# Patient Record
Sex: FEMALE | Race: BLACK OR AFRICAN AMERICAN
[De-identification: names, ages, dates, MRNs, and addresses within clinical notes are randomized per-mention and may not be internally consistent; named-entity substitution may affect disease eponyms.]

---

## 2020-03-24 ENCOUNTER — HOSPITAL ENCOUNTER (EMERGENCY)
Dept: HOSPITAL 53 - M ED | Age: 60
LOS: 1 days | Discharge: HOME | End: 2020-03-25
Payer: MEDICARE

## 2020-03-24 VITALS — HEIGHT: 67 IN | WEIGHT: 178.35 LBS | BODY MASS INDEX: 27.99 KG/M2

## 2020-03-24 DIAGNOSIS — J45.909: ICD-10-CM

## 2020-03-24 DIAGNOSIS — R07.9: Primary | ICD-10-CM

## 2020-03-24 DIAGNOSIS — Z95.2: ICD-10-CM

## 2020-03-24 DIAGNOSIS — I35.1: ICD-10-CM

## 2020-03-24 DIAGNOSIS — Z88.8: ICD-10-CM

## 2020-03-24 LAB
ALBUMIN SERPL BCG-MCNC: 4.1 GM/DL (ref 3.2–5.2)
ALT SERPL W P-5'-P-CCNC: 18 U/L (ref 12–78)
BASOPHILS # BLD AUTO: 0 10^3/UL (ref 0–0.2)
BASOPHILS NFR BLD AUTO: 0.4 % (ref 0–1)
BILIRUB CONJ SERPL-MCNC: 0.2 MG/DL (ref 0–0.2)
BILIRUB SERPL-MCNC: 0.9 MG/DL (ref 0.2–1)
CK MB CFR.DF SERPL CALC: 1.35
CK MB SERPL-MCNC: < 1 NG/ML (ref ?–3.6)
CK SERPL-CCNC: 74 U/L (ref 26–192)
EOSINOPHIL # BLD AUTO: 0.1 10^3/UL (ref 0–0.5)
EOSINOPHIL NFR BLD AUTO: 1.8 % (ref 0–3)
HCT VFR BLD AUTO: 37.1 % (ref 36–47)
HGB BLD-MCNC: 12.2 G/DL (ref 12–15.5)
INR PPP: 1.03
LIPASE SERPL-CCNC: 169 U/L (ref 73–393)
LYMPHOCYTES # BLD AUTO: 2.3 10^3/UL (ref 1.5–5)
LYMPHOCYTES NFR BLD AUTO: 46.6 % (ref 24–44)
MCH RBC QN AUTO: 33.2 PG (ref 27–33)
MCHC RBC AUTO-ENTMCNC: 32.9 G/DL (ref 32–36.5)
MCV RBC AUTO: 100.8 FL (ref 80–96)
MONOCYTES # BLD AUTO: 0.4 10^3/UL (ref 0–0.8)
MONOCYTES NFR BLD AUTO: 8.8 % (ref 0–5)
NEUTROPHILS # BLD AUTO: 2.1 10^3/UL (ref 1.5–8.5)
NEUTROPHILS NFR BLD AUTO: 42.2 % (ref 36–66)
PLATELET # BLD AUTO: 241 10^3/UL (ref 150–450)
PROT SERPL-MCNC: 8.3 GM/DL (ref 6.4–8.2)
PROTHROMBIN TIME: 13.2 SECONDS (ref 11.8–14)
RBC # BLD AUTO: 3.68 10^6/UL (ref 4–5.4)
TROPONIN I SERPL-MCNC: < 0.02 NG/ML (ref ?–0.1)
WBC # BLD AUTO: 4.9 10^3/UL (ref 4–10)

## 2020-03-24 PROCEDURE — 85610 PROTHROMBIN TIME: CPT

## 2020-03-24 PROCEDURE — 94760 N-INVAS EAR/PLS OXIMETRY 1: CPT

## 2020-03-24 PROCEDURE — 96361 HYDRATE IV INFUSION ADD-ON: CPT

## 2020-03-24 PROCEDURE — 93041 RHYTHM ECG TRACING: CPT

## 2020-03-24 PROCEDURE — 87633 RESP VIRUS 12-25 TARGETS: CPT

## 2020-03-24 PROCEDURE — 85025 COMPLETE CBC W/AUTO DIFF WBC: CPT

## 2020-03-24 PROCEDURE — 80047 BASIC METABLC PNL IONIZED CA: CPT

## 2020-03-24 PROCEDURE — 87798 DETECT AGENT NOS DNA AMP: CPT

## 2020-03-24 PROCEDURE — 80076 HEPATIC FUNCTION PANEL: CPT

## 2020-03-24 PROCEDURE — 93971 EXTREMITY STUDY: CPT

## 2020-03-24 PROCEDURE — 87581 M.PNEUMON DNA AMP PROBE: CPT

## 2020-03-24 PROCEDURE — 96374 THER/PROPH/DIAG INJ IV PUSH: CPT

## 2020-03-24 PROCEDURE — 83690 ASSAY OF LIPASE: CPT

## 2020-03-24 PROCEDURE — 71275 CT ANGIOGRAPHY CHEST: CPT

## 2020-03-24 PROCEDURE — 99285 EMERGENCY DEPT VISIT HI MDM: CPT

## 2020-03-24 PROCEDURE — 82550 ASSAY OF CK (CPK): CPT

## 2020-03-24 PROCEDURE — 82553 CREATINE MB FRACTION: CPT

## 2020-03-24 PROCEDURE — 87486 CHLMYD PNEUM DNA AMP PROBE: CPT

## 2020-03-24 PROCEDURE — 36415 COLL VENOUS BLD VENIPUNCTURE: CPT

## 2020-03-24 PROCEDURE — 93005 ELECTROCARDIOGRAM TRACING: CPT

## 2020-03-24 PROCEDURE — 71045 X-RAY EXAM CHEST 1 VIEW: CPT

## 2020-03-24 PROCEDURE — 84484 ASSAY OF TROPONIN QUANT: CPT

## 2020-03-24 NOTE — REPVR
PROCEDURE INFORMATION: 

Exam: CT Angiography Chest With Contrast 

Exam date and time: 3/24/2020 10:38 PM 

Age: 60 years old 

Clinical indication: Chest pain; Additional info: Central chest pain 



TECHNIQUE: 

Imaging protocol: Computed tomographic angiography of the chest with 

intravenous contrast. 

3D rendering: MIP and/or 3D reconstructed images were created by the 

technologist. 

Radiation optimization: All CT scans at this facility use at least one of these 

dose optimization techniques: automated exposure control; mA and/or kV 

adjustment per patient size (includes targeted exams where dose is matched to 

clinical indication); or iterative reconstruction. 

Contrast material: ISOVUE 370; Contrast volume: 75 ml; Contrast route: IV;  



COMPARISON: 

CR PORTABLE CHEST X-RAY 3/24/2020 9:56 PM 



FINDINGS: 

Pulmonary arteries: Normal. No pulmonary emboli. 

Aorta: Unremarkable. No aortic aneurysm. No aortic dissection. 

Lungs: Mild scarring in the right middle lobe.. No consolidation. No masses. 

Pleural space: Unremarkable. No pneumothorax. No pleural effusion. 

Heart: Unremarkable. No cardiomegaly. No pericardial effusion. 

Lymph nodes: Unremarkable. No enlarged lymph nodes. 

Bones/joints: Unremarkable. No acute fracture. 

Soft tissues: Unremarkable. 



IMPRESSION: 

No acute findings. 



Electronically signed by: Shahram Ko On 03/24/2020  22:52:33 PM

## 2020-03-24 NOTE — REPVR
PROCEDURE INFORMATION: 

Exam: US Duplex Left Lower Extremity Veins, Limited 

Exam date and time: 3/24/2020 11:23 PM 

Age: 60 years old 

Clinical indication: Pain; Leg, upper and leg, lower; Left; Additional info: 

Left leg pain after travel 



TECHNIQUE: 

Imaging protocol: Real-time Duplex ultrasound of the Left Lower Extremity with 

2-D gray scale, color Doppler flow and spectral waveform analysis with image 

documentation. Limited exam focused on the left lower extremity veins. 



COMPARISON: 

No relevant prior studies available. 



FINDINGS: 

Left deep veins: Unremarkable. The common femoral, femoral, proximal profunda 

femoral and popliteal veins are patent without thrombus. Normal Doppler 

waveforms. Normal compressibility and/or augmentation response.  

Left superficial veins: Unremarkable. Saphenofemoral junction is patent without 

thrombus. 



Soft tissues: Unremarkable. 



IMPRESSION: 

No acute findings. No evidence of deep vein thrombosis. 



Electronically signed by: Shahram Ko On 03/24/2020  23:35:09 PM

## 2020-03-25 VITALS — SYSTOLIC BLOOD PRESSURE: 131 MMHG | DIASTOLIC BLOOD PRESSURE: 71 MMHG

## 2020-03-25 NOTE — ECGEPIP
[facility] - ED

                                       

                                       Test Date:    2020

Pat Name:     SYL ALCANTAR            Department:   

Patient ID:   [id number]                 Room:         -

Gender:       Female                   Technician:   denise

:          1960               Requested By: TANA CARIAS

Order Number: [id number]     Reading MD:   Hussain De La Paz

                                 Measurements

Intervals                              Axis          

Rate:         66                       P:            14

WV:           182                      QRS:          1

QRSD:         82                       T:            25

QT:           414                                    

QTc:          436                                    

                           Interpretive Statements

SINUS RHYTHM WITH FREQUENT SUPRAVENTRICULAR PREMATURE COMPLEXES

BASELINE ARTIFACT AFFECTS INTERPRETATION

MODERATE ST DEPRESSION

NO PRIORS FOR COMPARISON

Electronically Signed on 3- 5:44:22 EDT by Hussain De La Paz

## 2020-03-25 NOTE — REP
Clinical:  Chest pain .

 

Comparison:  None .

 

Findings:

The mediastinum and cardiac silhouette are stable and within normal limits for

portable technique.  Evidence for prior aortic valve repair.  The lung fields are

clear without acute consolidation, effusion, or pneumothorax.  Skeletal

structures are intact.

 

Impression:

No acute cardiopulmonary process appreciated.

 

 

Electronically Signed by

Nikhil Sharp MD 03/25/2020 04:37 A

## 2020-06-17 ENCOUNTER — HOSPITAL ENCOUNTER (EMERGENCY)
Dept: HOSPITAL 53 - M ED | Age: 60
Discharge: HOME | End: 2020-06-17
Payer: COMMERCIAL

## 2020-06-17 VITALS — WEIGHT: 177.36 LBS | BODY MASS INDEX: 27.84 KG/M2 | HEIGHT: 67 IN

## 2020-06-17 VITALS — SYSTOLIC BLOOD PRESSURE: 124 MMHG | DIASTOLIC BLOOD PRESSURE: 70 MMHG

## 2020-06-17 DIAGNOSIS — Z79.01: ICD-10-CM

## 2020-06-17 DIAGNOSIS — M54.5: Primary | ICD-10-CM

## 2020-06-17 DIAGNOSIS — Z79.899: ICD-10-CM

## 2020-06-17 DIAGNOSIS — Z88.8: ICD-10-CM

## 2020-06-17 DIAGNOSIS — Z95.2: ICD-10-CM

## 2020-06-17 DIAGNOSIS — E03.9: ICD-10-CM

## 2020-06-17 DIAGNOSIS — M62.830: ICD-10-CM

## 2020-06-17 DIAGNOSIS — I48.91: ICD-10-CM

## 2020-06-17 DIAGNOSIS — R07.9: ICD-10-CM

## 2020-06-17 DIAGNOSIS — Z98.84: ICD-10-CM

## 2020-06-17 LAB
AMPHETAMINES UR QL SCN: NEGATIVE
APTT BLD: 29.7 SECONDS (ref 25–38.4)
BARBITURATES UR QL SCN: NEGATIVE
BENZODIAZ UR QL SCN: NEGATIVE
BUN SERPL-MCNC: 16 MG/DL (ref 7–18)
BZE UR QL SCN: NEGATIVE
CALCIUM SERPL-MCNC: 9.3 MG/DL (ref 8.8–10.2)
CANNABINOIDS UR QL SCN: NEGATIVE
CHLORIDE SERPL-SCNC: 108 MEQ/L (ref 98–107)
CO2 SERPL-SCNC: 31 MEQ/L (ref 21–32)
CREAT SERPL-MCNC: 0.8 MG/DL (ref 0.55–1.3)
GFR SERPL CREATININE-BSD FRML MDRD: > 60 ML/MIN/{1.73_M2} (ref 45–?)
GLUCOSE SERPL-MCNC: 94 MG/DL (ref 70–100)
HCT VFR BLD AUTO: 34.9 % (ref 36–47)
HGB BLD-MCNC: 11.5 G/DL (ref 12–15.5)
INR PPP: 1.01
MCH RBC QN AUTO: 33 PG (ref 27–33)
MCHC RBC AUTO-ENTMCNC: 33 G/DL (ref 32–36.5)
MCV RBC AUTO: 100.3 FL (ref 80–96)
METHADONE UR QL SCN: NEGATIVE
NT-PRO BNP: 59 PG/ML (ref ?–125)
OPIATES UR QL SCN: NEGATIVE
PCP UR QL SCN: NEGATIVE
PLATELET # BLD AUTO: 261 10^3/UL (ref 150–450)
POTASSIUM SERPL-SCNC: 3.9 MEQ/L (ref 3.5–5.1)
PROTHROMBIN TIME: 13 SECONDS (ref 11.8–14)
RBC # BLD AUTO: 3.48 10^6/UL (ref 4–5.4)
SODIUM SERPL-SCNC: 144 MEQ/L (ref 136–145)
WBC # BLD AUTO: 6.6 10^3/UL (ref 4–10)

## 2020-06-17 PROCEDURE — 80048 BASIC METABOLIC PNL TOTAL CA: CPT

## 2020-06-17 PROCEDURE — 83880 ASSAY OF NATRIURETIC PEPTIDE: CPT

## 2020-06-17 PROCEDURE — 71045 X-RAY EXAM CHEST 1 VIEW: CPT

## 2020-06-17 PROCEDURE — 87086 URINE CULTURE/COLONY COUNT: CPT

## 2020-06-17 PROCEDURE — 85610 PROTHROMBIN TIME: CPT

## 2020-06-17 PROCEDURE — 84484 ASSAY OF TROPONIN QUANT: CPT

## 2020-06-17 PROCEDURE — 96374 THER/PROPH/DIAG INJ IV PUSH: CPT

## 2020-06-17 PROCEDURE — 72110 X-RAY EXAM L-2 SPINE 4/>VWS: CPT

## 2020-06-17 PROCEDURE — 99284 EMERGENCY DEPT VISIT MOD MDM: CPT

## 2020-06-17 PROCEDURE — 85730 THROMBOPLASTIN TIME PARTIAL: CPT

## 2020-06-17 PROCEDURE — 93005 ELECTROCARDIOGRAM TRACING: CPT

## 2020-06-17 PROCEDURE — 80307 DRUG TEST PRSMV CHEM ANLYZR: CPT

## 2020-06-17 PROCEDURE — 80047 BASIC METABLC PNL IONIZED CA: CPT

## 2020-06-17 PROCEDURE — 85027 COMPLETE CBC AUTOMATED: CPT

## 2020-06-18 NOTE — REP
Clinical:  Lower back pain.

 

Technique:  AP, lateral, bilateral oblique and coned-down views of the

lumbosacral spine.

 

Findings:

Alignment and lordosis maintained.  No acute fracture / compression injury or

subluxation.  Moderate degenerative changes including endplate sclerosis, disc

space narrowing, and early osteophytosis primarily involves the L4-5 and L5-S1.

No spondylolysis or spondylolisthesis.

 

Impression:

Moderate degenerative spondylosis at L4-5 and L5-S1.

 

 

Electronically Signed by

Nikhil Sharp MD 06/18/2020 06:03 A

## 2020-06-18 NOTE — ECGEPIP
[facility] - ED

                                       

                                       Test Date:    2020

Pat Name:     SYL ALCANTAR            Department:   

Patient ID:   [id number]                 Room:         -

Gender:       Female                   Technician:   priyanka

:          1960               Requested By: JOAO TONY

Order Number: [id number]     Reading MD:   Joao Salgado

                                 Measurements

Intervals                              Axis          

Rate:         72                       P:            44

WI:           176                      QRS:          -3

QRSD:         87                       T:            -3

QT:           377                                    

QTc:          415                                    

                           Interpretive Statements

SINUS RHYTHM

Nonspecific T wave abnormality

Previous tracing on 3-24-20 had baseline artifact

Electronically Signed on 2020 1:40:10 EDT by Joao Salgado

## 2020-06-18 NOTE — REP
Clinical:  Acute chest pain .

 

Comparison: 03/24/2020 .

 

Technique:  PA and lateral.

 

Findings:

The mediastinum and cardiac silhouette are normal.  The lung fields are clear and

without acute consolidation, effusion, or pneumothorax.  The skeletal structures

are intact and normal.

 

Impression:

1.   No acute cardiopulmonary process.

 

 

Electronically Signed by

Nikhil Sharp MD 06/18/2020 06:01 A

## 2020-08-27 ENCOUNTER — HOSPITAL ENCOUNTER (EMERGENCY)
Dept: HOSPITAL 5 - ED | Age: 60
Discharge: HOME | End: 2020-08-27
Payer: SELF-PAY

## 2020-08-27 VITALS — DIASTOLIC BLOOD PRESSURE: 101 MMHG | SYSTOLIC BLOOD PRESSURE: 130 MMHG

## 2020-08-27 DIAGNOSIS — S05.01XA: Primary | ICD-10-CM
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
## 2020-08-27 DIAGNOSIS — Z79.899: ICD-10-CM
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
## 2020-08-27 DIAGNOSIS — S05.02XA: ICD-10-CM

## 2020-08-27 NOTE — EMERGENCY DEPARTMENT REPORT
ED Eye Problem HPI





- General


Chief complaint: Eye Problems


Stated complaint: EYE PAIN


Time Seen by Provider: 08/27/20 02:20


Source: patient, family


Mode of arrival: Ambulatory


Limitations: No Limitations





- History of Present Illness


Initial comments: 





Togolese interpretation by patient's daughter


Patient is a 60-year-old female presents emergency room with complaints of 

bilateral eye pain that began just prior to arrival.  Patient states that she 

was trying to put her contacts in and believes she accidentally scratched her 

eyes with her fingernails and rubbed her eyes with a paper towel which felt also

like she scratched her eyes.  She states that she has a foreign body sensation 

but does not did not get anything in the eyes that she is aware of.  She has 

associated blurry vision.  She does not have the contacts in currently, she was 

able to remove them.  She has multiple medical problems including diabetes, 

mitral valve replacement, hypertension, migraines.  No allergies to medications.





- Related Data


                                  Previous Rx's











 Medication  Instructions  Recorded  Last Taken  Type


 


Erythromycin [Erythromycin Ophth 1 applic OP QID 10 Days #1 tube 08/27/20 

Unknown Rx





Oint]    


 


traMADoL [Ultram 50 MG tab] 50 mg PO Q6HR PRN #12 tablet 08/27/20 Unknown Rx











                                    Allergies











Allergy/AdvReac Type Severity Reaction Status Date / Time


 


No Known Allergies Allergy   Unverified 08/27/20 00:26














ED Review of Systems


ROS: 


Stated complaint: EYE PAIN


Other details as noted in HPI





Comment: All other systems reviewed and negative





ED Past Medical Hx





- Past Medical History


Previous Medical History?: No





- Surgical History


Past Surgical History?: No





- Social History


Smoking Status: Never Smoker


Substance Use Type: None





- Medications


Home Medications: 


                                Home Medications











 Medication  Instructions  Recorded  Confirmed  Last Taken  Type


 


Erythromycin [Erythromycin Ophth 1 applic OP QID 10 Days #1 tube 08/27/20  

Unknown Rx





Oint]     


 


traMADoL [Ultram 50 MG tab] 50 mg PO Q6HR PRN #12 tablet 08/27/20  Unknown Rx














ED Physical Exam





- General


Limitations: No Limitations


General appearance: alert, other (tearful secondary to eye pain)





- Head


Head exam: Present: atraumatic, normocephalic





- Eye


Eye exam: Present: PERRL, EOMI, other (fluroscein stain with woods lamp shows a 

large corneal abrasion in each eye, it is approximately 0.5 cm, no obvious 

foreign body bilaterally, pupil is normal shape, eyes irrigated with saline, the

stain is still inside the area of uptake even after washing the eyes out).  

Absent: periorbital swelling, periorbital tenderness





- Neurological Exam


Neurological exam: Present: alert, oriented X3





- Psychiatric


Psychiatric exam: Present: normal affect, normal mood





- Skin


Skin exam: Present: warm, dry





ED Course


                                   Vital Signs











  08/27/20 08/27/20 08/27/20





  00:24 01:30 03:40


 


Temperature 98.0 F  


 


Pulse Rate 98 H  82


 


Respiratory 18 18 18





Rate   


 


Blood Pressure 130/101  


 


O2 Sat by Pulse 97  100





Oximetry   














ED Medical Decision Making





- Medical Decision Making





Togolese interpretation by patient's daughter


Patient is a 60-year-old female presents emergency room with complaints of 

bilateral eye pain that began just prior to arrival.  Patient states that she 

was trying to put her contacts in and believes she accidentally scratched her 

eyes with her fingernails and rubbed her eyes with a paper towel which felt also

like she scratched her eyes.  She states that she has a foreign body sensation 

but does not did not get anything in the eyes that she is aware of.  She has 

associated blurry vision.  She does not have the contacts in currently, she was 

able to remove them.  She has multiple medical problems including diabetes, 

mitral valve replacement, hypertension, migraines.  No allergies to medications.

vss. on exam: fluroscein stain with woods lamp shows a large corneal abrasion in

each eye, it is approximately 0.5 cm, no obvious foreign body bilaterally, pupil

is normal shape, eyes irrigated with saline, the stain is still inside the area 

of uptake even after washing the eyes out.  Discussed the patient presentation 

and all exam findings with Dr. Samano, ER attending who states could be 

related to epithelial injury, advised to write prescription for antibiotics and 

to have patient follow-up with ophthalmologist in the next 24 hours.  Patient 

given prescription for erythromycin ophthalmic ointment and tramadol.  Advised 

patient Please use medication as prescribed.  Please wash hands before placing 

medication.  Do not put your contacts in.  Do not drive or operate heavy 

machinery while taking pain medication.  Please follow-up with the 

ophthalmologist within the next 24 hours, you need to see them tomorrow morning 

first thing.  It is very important that you follow-up.  Return to emergency room

for new or worsening symptoms.


Critical care attestation.: 


If time is entered above; I have spent that time in minutes in the direct care 

of this critically ill patient, excluding procedure time.








ED Disposition


Clinical Impression: 


Corneal abrasion of both eyes


Qualifiers:


 Encounter type: initial encounter Qualified Code(s): S05.01XA - Injury of 

conjunctiva and corneal abrasion without foreign body, right eye, initial 

encounter





Disposition: DC-01 TO HOME OR SELFCARE


Is pt being admited?: No


Does the pt Need Aspirin: No


Condition: Stable


Instructions:  Corneal Abrasion (ED)


Additional Instructions: 


Please use medication as prescribed.  Please wash hands before placing 

medication.  Do not put your contacts in.  Do not drive or operate heavy 

machinery while taking pain medication.  Please follow-up with the 

ophthalmologist within the next 24 hours, you need to see them tomorrow morning 

first thing.  It is very important that you follow-up.  Return to emergency room

for new or worsening symptoms.











Utilice los medicamentos segn lo prescrito. Lvese las dimple antes de colocar 

el medicamento. No se ponga los lentes de contacto. No conduzca ni maneje 

maquinaria pesada mientras est tomando analgsicos. Bailey un seguimiento con el 

oftalmlogo dentro de las prximas 24 horas, debe verlo maana por la maana a 

primera hora. Es muy importante que bailey un seguimiento. Regrese a la jl de 

emergencias por sntomas nuevos o que empeoran.


Prescriptions: 


Erythromycin [Erythromycin Ophth Oint] 1 applic OP QID 10 Days #1 tube


traMADoL [Ultram 50 MG tab] 50 mg PO Q6HR PRN #12 tablet


 PRN Reason: Pain , Severe (7-10)


Referrals: 


SHAMIKA ESTES MD [Staff Physician] - 24 Hours


[facility] [Provider Group] - 24 Hours


Forms:  Accompanied Note


Time of Disposition: 03:30


Print Language: Bermudian